# Patient Record
Sex: FEMALE | Race: WHITE | NOT HISPANIC OR LATINO | URBAN - METROPOLITAN AREA
[De-identification: names, ages, dates, MRNs, and addresses within clinical notes are randomized per-mention and may not be internally consistent; named-entity substitution may affect disease eponyms.]

---

## 2017-05-24 ENCOUNTER — OUTPATIENT (OUTPATIENT)
Dept: OUTPATIENT SERVICES | Facility: HOSPITAL | Age: 52
LOS: 1 days | End: 2017-05-24

## 2017-05-26 ENCOUNTER — OUTPATIENT (OUTPATIENT)
Dept: OUTPATIENT SERVICES | Facility: HOSPITAL | Age: 52
LOS: 1 days | End: 2017-05-26

## 2017-08-16 ENCOUNTER — OUTPATIENT (OUTPATIENT)
Dept: OUTPATIENT SERVICES | Facility: HOSPITAL | Age: 52
LOS: 1 days | End: 2017-08-16

## 2017-08-22 ENCOUNTER — OUTPATIENT (OUTPATIENT)
Dept: OUTPATIENT SERVICES | Facility: HOSPITAL | Age: 52
LOS: 1 days | End: 2017-08-22

## 2017-10-17 PROBLEM — Z00.00 ENCOUNTER FOR PREVENTIVE HEALTH EXAMINATION: Status: ACTIVE | Noted: 2017-10-17

## 2017-10-21 ENCOUNTER — APPOINTMENT (OUTPATIENT)
Dept: ULTRASOUND IMAGING | Facility: CLINIC | Age: 52
End: 2017-10-21
Payer: COMMERCIAL

## 2017-10-21 ENCOUNTER — OUTPATIENT (OUTPATIENT)
Dept: OUTPATIENT SERVICES | Facility: HOSPITAL | Age: 52
LOS: 1 days | End: 2017-10-21
Payer: COMMERCIAL

## 2017-10-21 DIAGNOSIS — Z00.8 ENCOUNTER FOR OTHER GENERAL EXAMINATION: ICD-10-CM

## 2017-10-21 PROCEDURE — 76700 US EXAM ABDOM COMPLETE: CPT | Mod: 26

## 2017-10-21 PROCEDURE — 76700 US EXAM ABDOM COMPLETE: CPT

## 2017-11-18 ENCOUNTER — APPOINTMENT (OUTPATIENT)
Dept: RADIOLOGY | Facility: CLINIC | Age: 52
End: 2017-11-18
Payer: COMMERCIAL

## 2017-11-18 ENCOUNTER — APPOINTMENT (OUTPATIENT)
Dept: MRI IMAGING | Facility: CLINIC | Age: 52
End: 2017-11-18

## 2017-11-18 PROCEDURE — 70220 X-RAY EXAM OF SINUSES: CPT | Mod: 26

## 2017-12-16 ENCOUNTER — OUTPATIENT (OUTPATIENT)
Dept: OUTPATIENT SERVICES | Facility: HOSPITAL | Age: 52
LOS: 1 days | End: 2017-12-16
Payer: COMMERCIAL

## 2017-12-16 ENCOUNTER — APPOINTMENT (OUTPATIENT)
Dept: CT IMAGING | Facility: CLINIC | Age: 52
End: 2017-12-16
Payer: COMMERCIAL

## 2017-12-16 DIAGNOSIS — R10.9 UNSPECIFIED ABDOMINAL PAIN: ICD-10-CM

## 2017-12-16 PROCEDURE — 74177 CT ABD & PELVIS W/CONTRAST: CPT

## 2017-12-16 PROCEDURE — 74177 CT ABD & PELVIS W/CONTRAST: CPT | Mod: 26

## 2019-10-25 ENCOUNTER — APPOINTMENT (OUTPATIENT)
Dept: RHEUMATOLOGY | Facility: CLINIC | Age: 54
End: 2019-10-25
Payer: COMMERCIAL

## 2019-10-25 VITALS
DIASTOLIC BLOOD PRESSURE: 72 MMHG | SYSTOLIC BLOOD PRESSURE: 108 MMHG | WEIGHT: 170 LBS | HEART RATE: 85 BPM | OXYGEN SATURATION: 98 % | TEMPERATURE: 97.9 F | HEIGHT: 67 IN | BODY MASS INDEX: 26.68 KG/M2 | RESPIRATION RATE: 17 BRPM

## 2019-10-25 DIAGNOSIS — M19.90 UNSPECIFIED OSTEOARTHRITIS, UNSPECIFIED SITE: ICD-10-CM

## 2019-10-25 PROCEDURE — 99204 OFFICE O/P NEW MOD 45 MIN: CPT

## 2019-10-25 NOTE — PHYSICAL EXAM
[General Appearance - Alert] : alert [Sclera] : the sclera and conjunctiva were normal [General Appearance - In No Acute Distress] : in no acute distress [Outer Ear] : the ears and nose were normal in appearance [PERRL With Normal Accommodation] : pupils were equal in size, round, and reactive to light [Neck Appearance] : the appearance of the neck was normal [Oropharynx] : the oropharynx was normal [Heart Rate And Rhythm] : heart rate was normal and rhythm regular [Auscultation Breath Sounds / Voice Sounds] : lungs were clear to auscultation bilaterally [Heart Sounds Gallop] : no gallops [Murmurs] : no murmurs [Heart Sounds] : normal S1 and S2 [Bowel Sounds] : normal bowel sounds [Heart Sounds Pericardial Friction Rub] : no pericardial rub [] : no rash [Abdomen Soft] : soft [Abdomen Tenderness] : non-tender [Skin Lesions] : no skin lesions [No Focal Deficits] : no focal deficits [Oriented To Time, Place, And Person] : oriented to person, place, and time [No CVA Tenderness] : no ~M costovertebral angle tenderness [No Spinal Tenderness] : no spinal tenderness [Abnormal Walk] : normal gait [Nail Clubbing] : no clubbing  or cyanosis of the fingernails [Involuntary Movements] : no involuntary movements were seen [Musculoskeletal - Swelling] : no joint swelling seen [Motor Tone] : muscle strength and tone were normal

## 2019-10-25 NOTE — HISTORY OF PRESENT ILLNESS
[FreeTextEntry1] : 53 year old female with PMH as listed below presents today for an initial evaluation for polyarthritis, myalgias. Reports to have acute onset- 4 week hx of pain/swelling to BL wrists,hands, Right shoulder, BL knees. Also reports to have diffuse myalgias. + intermittent new oral ulcers. Taking ibuprofen with significant relief of symptoms. Symptoms 50-70% better  over the last 2 weeks. \par \par Denies previous similar episodes, denies fever, recent infections, travel. Labs with slightly elevated CRP. \par denies inflammatory back pain, enthesitis, uveitis, dactylitis, psoriasis/rashes, IBD\par \par denies fever, chills, weight loss, weight gain, fatigue, malaise, denies dry eye,eye pain, eye redness, vision changes, dry mouth, nasal congestion, difficulty swallowing,  denies ear pain, hearing changes, denies chest pain, chest palpitations, denies sob, denies nausea, vomiting, abdominal pain, diarrhea, constipation, blood in stool, denies dysuria, blood in the urine,  denies rashes, photosensitivity, hair loss, skin thickening, denies blood clots, miscarriages, raynauds, denies weakness, numbness, syncope, falls, headaches, denies depression, anxiety, denies bruising easily\par \par

## 2019-10-25 NOTE — ASSESSMENT
[FreeTextEntry1] : 53 year old female presenting today for an initial evaluation for acute onset polyarthritis, myalgias, intermittent oral ulcers x 4 weeks now, currently with 50-70% improvement in symptoms over the last 2 weeks. Denies hx fevers, infections, travel, inflammatory back pain, enthesitis, uveitis, dactylitis, psoriasis/rashes. Unclear etiology of symptoms at this point-may be reactive to subclinical infection? At this point, as pts symptoms are improving will continue to monitor. She was instructed to c/w NSAIDS/Tylenol prn for pain (reviewed risk and benefits of medications). \par \par Discussed treatment plan with the patient. The patient was given the opportunity to ask questions and all questions were answered to their satisfaction.\par

## 2019-11-08 ENCOUNTER — APPOINTMENT (OUTPATIENT)
Dept: RHEUMATOLOGY | Facility: CLINIC | Age: 54
End: 2019-11-08

## 2020-01-24 ENCOUNTER — APPOINTMENT (OUTPATIENT)
Dept: RHEUMATOLOGY | Facility: CLINIC | Age: 55
End: 2020-01-24

## 2023-09-28 ENCOUNTER — OFFICE (OUTPATIENT)
Dept: URBAN - METROPOLITAN AREA CLINIC 113 | Facility: CLINIC | Age: 58
Setting detail: OPHTHALMOLOGY
End: 2023-09-28
Payer: COMMERCIAL

## 2023-09-28 DIAGNOSIS — H00.15: ICD-10-CM

## 2023-09-28 DIAGNOSIS — H01.004: ICD-10-CM

## 2023-09-28 DIAGNOSIS — H01.001: ICD-10-CM

## 2023-09-28 PROCEDURE — 99202 OFFICE O/P NEW SF 15 MIN: CPT | Performed by: STUDENT IN AN ORGANIZED HEALTH CARE EDUCATION/TRAINING PROGRAM

## 2023-09-28 ASSESSMENT — VISUAL ACUITY
OS_BCVA: 20/20
OD_BCVA: 20/20-1

## 2023-09-28 ASSESSMENT — SPHEQUIV_DERIVED: OD_SPHEQUIV: 0.375

## 2023-09-28 ASSESSMENT — TONOMETRY
OS_IOP_MMHG: 12
OD_IOP_MMHG: 11

## 2023-09-28 ASSESSMENT — REFRACTION_AUTOREFRACTION
OD_CYLINDER: -1.25
OS_CYLINDER: -0.75
OS_AXIS: 087
OD_SPHERE: +1.00
OS_SPHERE: PLANO
OD_AXIS: 102

## 2023-09-28 ASSESSMENT — KERATOMETRY
OD_K1POWER_DIOPTERS: 45.50
OS_K2POWER_DIOPTERS: 46.25
OD_K2POWER_DIOPTERS: 46.25
OS_K1POWER_DIOPTERS: 45.50
OD_AXISANGLE_DEGREES: 018
OS_AXISANGLE_DEGREES: 169

## 2023-09-28 ASSESSMENT — LID EXAM ASSESSMENTS
OS_BLEPHARITIS: LUL 1+
OD_BLEPHARITIS: RUL 1+

## 2023-09-28 ASSESSMENT — CONFRONTATIONAL VISUAL FIELD TEST (CVF)
OD_FINDINGS: FULL
OS_FINDINGS: FULL

## 2023-09-28 ASSESSMENT — AXIALLENGTH_DERIVED: OD_AL: 22.6156
